# Patient Record
Sex: FEMALE | URBAN - METROPOLITAN AREA
[De-identification: names, ages, dates, MRNs, and addresses within clinical notes are randomized per-mention and may not be internally consistent; named-entity substitution may affect disease eponyms.]

---

## 2020-02-13 ENCOUNTER — APPOINTMENT (RX ONLY)
Dept: URBAN - METROPOLITAN AREA CLINIC 173 | Facility: CLINIC | Age: 50
Setting detail: DERMATOLOGY
End: 2020-02-13

## 2020-02-13 DIAGNOSIS — Z41.9 ENCOUNTER FOR PROCEDURE FOR PURPOSES OTHER THAN REMEDYING HEALTH STATE, UNSPECIFIED: ICD-10-CM

## 2020-02-13 PROCEDURE — ? BOTOX

## 2020-02-13 PROCEDURE — ? COSMETIC CONSULTATION: LASER RESURFACING

## 2020-02-13 PROCEDURE — ? COSMETIC CONSULTATION: BOTULINUM TOXIN

## 2020-02-13 NOTE — PROCEDURE: BOTOX
Detail Level: Detailed
Left Pupillary Line Units: 0
Show Masseter Units: Yes
Show Ucl Units: No
Lot #: [id number]
Price (Use Numbers Only, No Special Characters Or $): 120
Dilution (U/0.1 Cc): 5
Consent: Written consent obtained. Risks include but not limited to lid/brow ptosis, bruising, swelling, diplopia, temporary effect, incomplete chemical denervation.
Additional Area 1 Location: lips
Post-Care Instructions: Patient instructed to not lie down for 4 hours and limit physical activity for 24 hours. Patient instructed not to travel by airplane for 48 hours.
Expiration Date (Month Year): 8/22
Additional Area 1 Units: 6

## 2020-02-27 ENCOUNTER — APPOINTMENT (RX ONLY)
Dept: URBAN - METROPOLITAN AREA CLINIC 173 | Facility: CLINIC | Age: 50
Setting detail: DERMATOLOGY
End: 2020-02-27

## 2020-02-27 DIAGNOSIS — Z41.9 ENCOUNTER FOR PROCEDURE FOR PURPOSES OTHER THAN REMEDYING HEALTH STATE, UNSPECIFIED: ICD-10-CM

## 2020-02-27 PROCEDURE — ? BOTOX

## 2020-02-27 NOTE — PROCEDURE: BOTOX
Show Right And Left Pupillary Line Units: No
Consent: Written consent obtained. Risks include but not limited to lid/brow ptosis, bruising, swelling, diplopia, temporary effect, incomplete chemical denervation.
Additional Area 2 Units: 6
Periorbital Skin Units: 0
Lot #: [id number]
Detail Level: Detailed
Dilution (U/0.1 Cc): 5
Show Additional Area 5: Yes
Post-Care Instructions: Patient instructed to not lie down for 4 hours and limit physical activity for 24 hours. Patient instructed not to travel by airplane for 48 hours.
Additional Area 3 Location: periorb
Forehead Units: 7
Additional Area 1 Location: upper lip
Additional Area 3 Units: 28
Topical Anesthesia?: 23% lidocaine, 7% tetracaine
Glabellar Complex Units: 18
Expiration Date (Month Year): 8/22
Additional Area 1 Units: 3
Price (Use Numbers Only, No Special Characters Or $): 570
Additional Area 2 Location: [facility]

## 2020-02-28 ENCOUNTER — APPOINTMENT (RX ONLY)
Dept: URBAN - METROPOLITAN AREA CLINIC 173 | Facility: CLINIC | Age: 50
Setting detail: DERMATOLOGY
End: 2020-02-28

## 2020-02-28 DIAGNOSIS — Z41.9 ENCOUNTER FOR PROCEDURE FOR PURPOSES OTHER THAN REMEDYING HEALTH STATE, UNSPECIFIED: ICD-10-CM

## 2020-02-28 PROCEDURE — ? PALOMAR ICON LASER

## 2020-02-28 ASSESSMENT — LOCATION DETAILED DESCRIPTION DERM: LOCATION DETAILED: LEFT UPPER CUTANEOUS LIP

## 2020-02-28 ASSESSMENT — LOCATION SIMPLE DESCRIPTION DERM: LOCATION SIMPLE: LEFT LIP

## 2020-02-28 ASSESSMENT — LOCATION ZONE DERM: LOCATION ZONE: LIP

## 2020-02-28 NOTE — PROCEDURE: PALOMAR ICON LASER
Treated Area: small area
Fluence Units: J/cm2
Treatment Number: 1
Post-Care Instructions: I reviewed with the patient in detail post-care instructions. Patient should stay away from the sun and wear sun protection until treated areas are fully healed.
Pulse Duration (In Milliseconds): 20
Pre-Procedure Text: The patient's skin was cleaned and Lux Lotion was applied.
Fluence: 25
Detail Level: Simple
Location: upper cutaneous lip
Consent: Written consent obtained, risks reviewed including but not limited to crusting, scabbing, blistering, scarring, darker or lighter pigmentary change, bruising, and/or incomplete response.
Pulse Duration (In Milliseconds): 20
Price (Use Numbers Only, No Special Characters Or $): 0
Render Post Care In The Note?: yes
External Cooling Fan Speed: 5
Fluence: 30

## 2020-03-12 ENCOUNTER — APPOINTMENT (RX ONLY)
Dept: URBAN - METROPOLITAN AREA CLINIC 173 | Facility: CLINIC | Age: 50
Setting detail: DERMATOLOGY
End: 2020-03-12

## 2020-03-12 DIAGNOSIS — Z41.9 ENCOUNTER FOR PROCEDURE FOR PURPOSES OTHER THAN REMEDYING HEALTH STATE, UNSPECIFIED: ICD-10-CM

## 2020-03-12 PROCEDURE — ? BOTOX

## 2020-03-12 NOTE — PROCEDURE: BOTOX
[facility] Units: 0
Show Right And Left Brow Units: No
Periorbital Skin Units: 4.5
Additional Area 2 Location: [facility]
Show Topical Anesthesia: Yes
Additional Area 3 Location: periorb
Consent: Written consent obtained. Risks include but not limited to lid/brow ptosis, bruising, swelling, diplopia, temporary effect, incomplete chemical denervation.
Lot #: [id number]**marcy special**
Expiration Date (Month Year): 8/22
Post-Care Instructions: Patient instructed to not lie down for 4 hours and limit physical activity for 24 hours. Patient instructed not to travel by airplane for 48 hours.
Dilution (U/0.1 Cc): 5
Detail Level: Detailed

## 2020-06-30 ENCOUNTER — APPOINTMENT (RX ONLY)
Dept: URBAN - METROPOLITAN AREA CLINIC 173 | Facility: CLINIC | Age: 50
Setting detail: DERMATOLOGY
End: 2020-06-30

## 2020-06-30 DIAGNOSIS — Z41.9 ENCOUNTER FOR PROCEDURE FOR PURPOSES OTHER THAN REMEDYING HEALTH STATE, UNSPECIFIED: ICD-10-CM

## 2020-06-30 PROCEDURE — ? BOTOX

## 2020-06-30 ASSESSMENT — LOCATION SIMPLE DESCRIPTION DERM: LOCATION SIMPLE: LEFT CHEEK

## 2020-06-30 ASSESSMENT — LOCATION ZONE DERM: LOCATION ZONE: FACE

## 2020-06-30 ASSESSMENT — LOCATION DETAILED DESCRIPTION DERM: LOCATION DETAILED: LEFT INFERIOR CENTRAL MALAR CHEEK

## 2020-06-30 NOTE — PROCEDURE: BOTOX
Additional Area 2 Units: 0
Consent: Written consent obtained. Risks include but not limited to lid/brow ptosis, bruising, swelling, diplopia, temporary effect, incomplete chemical denervation.
Show Ucl Units: No
Show Anterior Platysmal Band Units: Yes
Lot #: [id number]
Dilution (U/0.1 Cc): 5
Additional Area 1 Location: face
Post-Care Instructions: Patient instructed to not lie down for 4 hours and limit physical activity for 24 hours. Patient instructed not to travel by airplane for 48 hours.
Additional Area 1 Units: 70
Expiration Date (Month Year): 9/22
Detail Level: Zone
Price (Use Numbers Only, No Special Characters Or $): 248

## 2020-07-07 ENCOUNTER — RX ONLY (OUTPATIENT)
Age: 50
Setting detail: RX ONLY
End: 2020-07-07

## 2020-07-07 RX ORDER — PHARMACY COMPOUNDING ACCESSORY
EACH MISCELLANEOUS
Qty: 1 | Refills: 1 | Status: ERX

## 2020-07-09 ENCOUNTER — APPOINTMENT (RX ONLY)
Dept: URBAN - METROPOLITAN AREA CLINIC 173 | Facility: CLINIC | Age: 50
Setting detail: DERMATOLOGY
End: 2020-07-09

## 2020-07-09 DIAGNOSIS — Z41.9 ENCOUNTER FOR PROCEDURE FOR PURPOSES OTHER THAN REMEDYING HEALTH STATE, UNSPECIFIED: ICD-10-CM

## 2020-07-09 PROCEDURE — ? SECRET RF

## 2020-07-09 PROCEDURE — ? BOTOX

## 2020-07-09 NOTE — HPI: COSMETIC PROCEDURE
When Outside In The Sun, Do You...: mostly tans, rarely burns
Additional History: Patient is in clinic today for her first treatment with Pixel8 RF.

## 2020-07-09 NOTE — PROCEDURE: BOTOX
Forehead Units: 1.5
Show Ucl Units: No
Nasal Root Units: 0
Show Anterior Platysmal Band Units: Yes
Consent: Written consent obtained. Risks include but not limited to lid/brow ptosis, bruising, swelling, diplopia, temporary effect, incomplete chemical denervation.
Lot #: [id number]
Post-Care Instructions: Patient instructed to not lie down for 4 hours and limit physical activity for 24 hours. Patient instructed not to travel by airplane for 48 hours.
Dilution (U/0.1 Cc): 5
Expiration Date (Month Year): 12/22
Detail Level: Detailed

## 2020-07-09 NOTE — PROCEDURE: SECRET RF
Depth In Microns: 0.5
Tip: 24-Insulated
Passes: 0
Intensity (Include Units If Applicable): 6
Treatment Area: face
Passes: 2
Consent: Written consent obtained, risks reviewed including but not limited to darker or lighter pigmentary change, and/or incomplete improvement.
Render Post-Care In The Note: Yes
Post-Care Instructions: I reviewed with the patient in detail post-care instructions. Patient should apply moisturizer until fully healed.
Detail Level: Simple
Price (Use Numbers Only, No Special Characters Or $): 500
Indication: Wrinkles
Treatment Number: 1
Rf Duration (Include Units If Applicable): 300
Use Distraction Techniques In Note: No

## 2020-08-06 ENCOUNTER — APPOINTMENT (RX ONLY)
Dept: URBAN - METROPOLITAN AREA CLINIC 173 | Facility: CLINIC | Age: 50
Setting detail: DERMATOLOGY
End: 2020-08-06

## 2020-08-06 DIAGNOSIS — Z41.9 ENCOUNTER FOR PROCEDURE FOR PURPOSES OTHER THAN REMEDYING HEALTH STATE, UNSPECIFIED: ICD-10-CM

## 2020-08-06 PROCEDURE — ? SECRET RF

## 2020-08-06 NOTE — HPI: COSMETIC (LASER RESURFACING)
Have You Had Laser Resurfacing Before?: has had previous treatments
When Was Your Last Laser Resurfacing Treatment?: 7/9/20

## 2020-08-06 NOTE — PROCEDURE: SECRET RF
Tip: 24-Insulated
Passes: 0
Depth In Microns: 0.5
Use Distraction Techniques In Note: No
Detail Level: Simple
Depth In Microns: 1.5
Additional Treatment Area: perioral
Intensity (Include Units If Applicable): 5
Rf Duration (Include Units If Applicable): 200
Passes: 2
Rf Duration (Include Units If Applicable): 200
Post-Care Instructions: I reviewed with the patient in detail post-care instructions. Patient should apply moisturizer until fully healed.
Treatment Number: 1
Consent: Written consent obtained, risks reviewed including but not limited to darker or lighter pigmentary change, and/or incomplete improvement.
Treatment Area: periorbital
Additional Treatment Area: forehead
Treatment Area: mid-lower face

## 2020-11-19 ENCOUNTER — APPOINTMENT (RX ONLY)
Dept: URBAN - METROPOLITAN AREA CLINIC 173 | Facility: CLINIC | Age: 50
Setting detail: DERMATOLOGY
End: 2020-11-19

## 2020-11-19 DIAGNOSIS — Z41.9 ENCOUNTER FOR PROCEDURE FOR PURPOSES OTHER THAN REMEDYING HEALTH STATE, UNSPECIFIED: ICD-10-CM

## 2020-11-19 PROCEDURE — ? BOTOX

## 2020-11-19 NOTE — PROCEDURE: BOTOX
Forehead Units: 7
Show Ucl Units: No
Nasal Root Units: 0
Show Anterior Platysmal Band Units: Yes
Depressor Anguli Oris Units: 6
Consent: Written consent obtained. Risks include but not limited to lid/brow ptosis, bruising, swelling, diplopia, temporary effect, incomplete chemical denervation.
Lot #: [id number]
Post-Care Instructions: Patient instructed to not lie down for 4 hours and limit physical activity for 24 hours. Patient instructed not to travel by airplane for 48 hours.
Additional Area 1 Location: Lips
Dilution (U/0.1 Cc): 5
Expiration Date (Month Year): 6/23
Glabellar Complex Units: 18
Additional Area 1 Units: 8
Detail Level: Detailed
Price (Use Numbers Only, No Special Characters Or $): 644
Inferior Lateral Orbicularis Oculi Units: 31

## 2020-12-03 ENCOUNTER — APPOINTMENT (RX ONLY)
Dept: URBAN - METROPOLITAN AREA CLINIC 173 | Facility: CLINIC | Age: 50
Setting detail: DERMATOLOGY
End: 2020-12-03

## 2020-12-03 DIAGNOSIS — L57.8 OTHER SKIN CHANGES DUE TO CHRONIC EXPOSURE TO NONIONIZING RADIATION: ICD-10-CM

## 2020-12-03 DIAGNOSIS — Z41.9 ENCOUNTER FOR PROCEDURE FOR PURPOSES OTHER THAN REMEDYING HEALTH STATE, UNSPECIFIED: ICD-10-CM

## 2020-12-03 PROCEDURE — ? SECRET RF

## 2020-12-03 PROCEDURE — ? BOTOX

## 2020-12-03 PROCEDURE — ? TREATMENT REGIMEN

## 2020-12-03 ASSESSMENT — LOCATION DETAILED DESCRIPTION DERM
LOCATION DETAILED: LEFT SUPERIOR LATERAL NECK
LOCATION DETAILED: RIGHT CENTRAL MALAR CHEEK

## 2020-12-03 ASSESSMENT — LOCATION ZONE DERM
LOCATION ZONE: NECK
LOCATION ZONE: FACE

## 2020-12-03 ASSESSMENT — LOCATION SIMPLE DESCRIPTION DERM
LOCATION SIMPLE: LEFT ANTERIOR NECK
LOCATION SIMPLE: RIGHT CHEEK

## 2020-12-03 NOTE — PROCEDURE: SECRET RF
Tip: 24-Insulated
Depth In Microns: 0.5
Passes: 0
Tip: 64-Insulated
Post-Care Instructions: I reviewed with the patient in detail post-care instructions. Patient should apply moisturizer until fully healed.
Passes: 3
Use Distraction Techniques In Note: No
Intensity (Include Units If Applicable): 5
Rf Duration (Include Units If Applicable): 200ms
Indication: Wrinkles
Passes: 2
Additional Treatment Area: forehead
Treatment Area: periorbital
Rf Duration (Include Units If Applicable): 300
Additional Treatment Area: neck
Intensity (Include Units If Applicable): 6
Treatment Number: 1
Rf Duration (Include Units If Applicable): 300ms
Detail Level: Zone
Additional Treatment Area: mid-lower face
Additional Treatment Area: upper face
Consent: Written consent obtained, risks reviewed including but not limited to darker or lighter pigmentary change, and/or incomplete improvement.
Render Post-Care In The Note: Yes

## 2020-12-03 NOTE — PROCEDURE: BOTOX
Additional Area 6 Units: 0
Show Mentalis Units: No
Show Periorbital Units: Yes
Expiration Date (Month Year): 6/2023
Lot #: [id number] *JESSICA special*
Consent: Written consent obtained. Risks include but not limited to lid/brow ptosis, bruising, swelling, diplopia, temporary effect, incomplete chemical denervation.
Forehead Units: 2
Post-Care Instructions: Patient instructed to not lie down for 4 hours and limit physical activity for 24 hours. Patient instructed not to travel by airplane for 48 hours.
Dilution (U/0.1 Cc): 5
Detail Level: Detailed
Never smoker

## 2020-12-03 NOTE — PROCEDURE: TREATMENT REGIMEN
Detail Level: Zone
Samples Given: Skin better Alpharet nightly as tolerated instead of current retinoid \\nAlastin skin restorative

## 2021-02-16 ENCOUNTER — APPOINTMENT (RX ONLY)
Dept: URBAN - METROPOLITAN AREA CLINIC 173 | Facility: CLINIC | Age: 51
Setting detail: DERMATOLOGY
End: 2021-02-16

## 2021-02-16 DIAGNOSIS — Z41.9 ENCOUNTER FOR PROCEDURE FOR PURPOSES OTHER THAN REMEDYING HEALTH STATE, UNSPECIFIED: ICD-10-CM

## 2021-02-16 PROCEDURE — ? BOTOX

## 2021-02-16 PROCEDURE — ? COSMETIC CONSULTATION: LASER RESURFACING

## 2021-02-16 NOTE — PROCEDURE: BOTOX
Show Glabellar Units: Yes
L Brow Units: 0
Glabellar Complex Units: 18
Additional Area 1 Units: 8
Show Mentalis Units: No
Detail Level: Detailed
Price (Use Numbers Only, No Special Characters Or $): 532
Inferior Lateral Orbicularis Oculi Units: 29
Expiration Date (Month Year): 9/23
Dilution (U/0.1 Cc): 5
Consent: Written consent obtained. Risks include but not limited to lid/brow ptosis, bruising, swelling, diplopia, temporary effect, incomplete chemical denervation.
Depressor Anguli Oris Units: 6
Lot #: [id number]
Additional Area 1 Location: lip
Post-Care Instructions: Patient instructed to not lie down for 4 hours and limit physical activity for 24 hours. Patient instructed not to travel by airplane for 48 hours.
Forehead Units: 3.5

## 2021-03-03 ENCOUNTER — APPOINTMENT (RX ONLY)
Dept: URBAN - METROPOLITAN AREA CLINIC 173 | Facility: CLINIC | Age: 51
Setting detail: DERMATOLOGY
End: 2021-03-03

## 2021-03-03 DIAGNOSIS — Z41.9 ENCOUNTER FOR PROCEDURE FOR PURPOSES OTHER THAN REMEDYING HEALTH STATE, UNSPECIFIED: ICD-10-CM

## 2021-03-03 PROCEDURE — ? BOTOX

## 2021-03-03 ASSESSMENT — LOCATION DETAILED DESCRIPTION DERM: LOCATION DETAILED: LEFT INFERIOR CENTRAL MALAR CHEEK

## 2021-03-03 ASSESSMENT — LOCATION ZONE DERM: LOCATION ZONE: FACE

## 2021-03-03 ASSESSMENT — LOCATION SIMPLE DESCRIPTION DERM: LOCATION SIMPLE: LEFT CHEEK

## 2021-03-03 NOTE — PROCEDURE: BOTOX
Show Nasal Units: Yes
Additional Area 1 Units: 0
Expiration Date (Month Year): 5/2022
Show Mentalis Units: No
Inferior Lateral Orbicularis Oculi Units: 12
Consent: Written consent obtained. Risks include but not limited to lid/brow ptosis, bruising, swelling, diplopia, temporary effect, incomplete chemical denervation.
Dilution (U/0.1 Cc): 5
Detail Level: Detailed
Post-Care Instructions: Patient instructed to not lie down for 4 hours and limit physical activity for 24 hours. Patient instructed not to travel by airplane for 48 hours.
Lot #: [id number] *JESSICA special*

## 2021-05-11 ENCOUNTER — APPOINTMENT (RX ONLY)
Dept: URBAN - METROPOLITAN AREA CLINIC 173 | Facility: CLINIC | Age: 51
Setting detail: DERMATOLOGY
End: 2021-05-11

## 2021-05-11 DIAGNOSIS — Z41.9 ENCOUNTER FOR PROCEDURE FOR PURPOSES OTHER THAN REMEDYING HEALTH STATE, UNSPECIFIED: ICD-10-CM

## 2021-05-11 PROCEDURE — ? BOTOX

## 2021-05-11 NOTE — PROCEDURE: BOTOX
Additional Area 6 Units: 0
Show Nasal Units: Yes
Show Lcl Units: No
Expiration Date (Month Year): 7/23
Lot #: [id number]
Price (Use Numbers Only, No Special Characters Or $): 150
Dilution (U/0.1 Cc): 5
Additional Area 1 Location: face
Consent: Written consent obtained. Risks include but not limited to lid/brow ptosis, bruising, swelling, diplopia, temporary effect, incomplete chemical denervation.
Detail Level: Detailed
Additional Area 1 Units: 8
Post-Care Instructions: Patient instructed to not lie down for 4 hours and limit physical activity for 24 hours. Patient instructed not to travel by airplane for 48 hours.

## 2021-05-17 ENCOUNTER — APPOINTMENT (RX ONLY)
Dept: URBAN - METROPOLITAN AREA CLINIC 173 | Facility: CLINIC | Age: 51
Setting detail: DERMATOLOGY
End: 2021-05-17

## 2021-05-17 DIAGNOSIS — Z41.9 ENCOUNTER FOR PROCEDURE FOR PURPOSES OTHER THAN REMEDYING HEALTH STATE, UNSPECIFIED: ICD-10-CM

## 2021-05-17 PROCEDURE — ? BOTOX

## 2021-05-17 PROCEDURE — ? PRESCRIPTION

## 2021-05-17 RX ORDER — DOXYCYCLINE HYCLATE 100 MG/1
CAPSULE, GELATIN COATED ORAL
Qty: 14 | Refills: 0 | Status: ERX

## 2021-05-17 RX ORDER — VALACYCLOVIR HYDROCHLORIDE 500 MG/1
TABLET, FILM COATED ORAL
Qty: 10 | Refills: 0 | Status: ERX | COMMUNITY
Start: 2021-05-17

## 2021-05-17 RX ADMIN — VALACYCLOVIR HYDROCHLORIDE: 500 TABLET, FILM COATED ORAL at 00:00

## 2021-05-17 NOTE — PROCEDURE: BOTOX
Detail Level: Detailed
Show Mentalis Units: No
Right Pupillary Line Units: 0
Show Lateral Platysmal Band Units: Yes
Additional Area 1 Units: 8
Expiration Date (Month Year): 9/22
Lot #: [id number]
Depressor Anguli Oris Units: 6
Forehead Units: 3.5
Inferior Lateral Orbicularis Oculi Units: 31
Price (Use Numbers Only, No Special Characters Or $): 598
Post-Care Instructions: Patient instructed to not lie down for 4 hours and limit physical activity for 24 hours. Patient instructed not to travel by airplane for 48 hours.
Dilution (U/0.1 Cc): 5
Consent: Written consent obtained. Risks include but not limited to lid/brow ptosis, bruising, swelling, diplopia, temporary effect, incomplete chemical denervation.
Glabellar Complex Units: 18
Additional Area 1 Location: perioral

## 2021-08-31 ENCOUNTER — APPOINTMENT (RX ONLY)
Dept: URBAN - METROPOLITAN AREA CLINIC 173 | Facility: CLINIC | Age: 51
Setting detail: DERMATOLOGY
End: 2021-08-31

## 2021-08-31 DIAGNOSIS — Z41.9 ENCOUNTER FOR PROCEDURE FOR PURPOSES OTHER THAN REMEDYING HEALTH STATE, UNSPECIFIED: ICD-10-CM

## 2021-08-31 PROCEDURE — ? BOTOX

## 2021-08-31 NOTE — PROCEDURE: BOTOX
Detail Level: Detailed
Show Mentalis Units: No
Right Pupillary Line Units: 0
Show Lateral Platysmal Band Units: Yes
Periorbital Skin Units: 29
Expiration Date (Month Year): 10/23
Lot #: [id number]
Depressor Anguli Oris Units: 6
Forehead Units: 3
Price (Use Numbers Only, No Special Characters Or $): 993
Post-Care Instructions: Patient instructed to not lie down for 4 hours and limit physical activity for 24 hours. Patient instructed not to travel by airplane for 48 hours.
Dilution (U/0.1 Cc): 5
Consent: Written consent obtained. Risks include but not limited to lid/brow ptosis, bruising, swelling, diplopia, temporary effect, incomplete chemical denervation.
Glabellar Complex Units: 12

## 2021-09-27 ENCOUNTER — APPOINTMENT (RX ONLY)
Dept: URBAN - METROPOLITAN AREA CLINIC 173 | Facility: CLINIC | Age: 51
Setting detail: DERMATOLOGY
End: 2021-09-27

## 2021-09-27 DIAGNOSIS — Z41.9 ENCOUNTER FOR PROCEDURE FOR PURPOSES OTHER THAN REMEDYING HEALTH STATE, UNSPECIFIED: ICD-10-CM

## 2021-09-27 PROCEDURE — ? BOTOX

## 2021-09-27 NOTE — PROCEDURE: BOTOX
Additional Area 6 Units: 0
Show Levator Superior Units: Yes
Show Mentalis Units: No
Expiration Date (Month Year): 11/23
Detail Level: Detailed
Lot #: [id number]**special**
Forehead Units: 2
Post-Care Instructions: Patient instructed to not lie down for 4 hours and limit physical activity for 24 hours. Patient instructed not to travel by airplane for 48 hours.
Consent: Written consent obtained. Risks include but not limited to lid/brow ptosis, bruising, swelling, diplopia, temporary effect, incomplete chemical denervation.
Dilution (U/0.1 Cc): 5

## 2021-09-28 ENCOUNTER — RX ONLY (OUTPATIENT)
Age: 51
Setting detail: RX ONLY
End: 2021-09-28

## 2021-09-28 RX ORDER — DIAZEPAM 5 MG/1
TABLET ORAL ONCE
Qty: 2 | Refills: 0 | Status: ERX | COMMUNITY
Start: 2021-09-27

## 2021-10-07 ENCOUNTER — APPOINTMENT (RX ONLY)
Dept: URBAN - METROPOLITAN AREA CLINIC 173 | Facility: CLINIC | Age: 51
Setting detail: DERMATOLOGY
End: 2021-10-07

## 2021-10-07 DIAGNOSIS — Z41.9 ENCOUNTER FOR PROCEDURE FOR PURPOSES OTHER THAN REMEDYING HEALTH STATE, UNSPECIFIED: ICD-10-CM

## 2021-10-07 PROCEDURE — ? PRO-NOX

## 2021-10-07 PROCEDURE — ? ULTRAPULSE LASER

## 2021-10-07 ASSESSMENT — LOCATION SIMPLE DESCRIPTION DERM
LOCATION SIMPLE: LEFT ANTERIOR NECK
LOCATION SIMPLE: LEFT CHEEK

## 2021-10-07 ASSESSMENT — LOCATION ZONE DERM
LOCATION ZONE: FACE
LOCATION ZONE: NECK

## 2021-10-07 ASSESSMENT — LOCATION DETAILED DESCRIPTION DERM
LOCATION DETAILED: LEFT INFERIOR CENTRAL MALAR CHEEK
LOCATION DETAILED: LEFT SUPERIOR ANTERIOR NECK

## 2021-10-07 NOTE — PROCEDURE: ULTRAPULSE LASER
Cool Scan: on
Density % (Set To 0 If Using Density Field Above): 0
Number Of Pulses: 1
Additional Laser Settings?: no
Post-Care Instructions: I reviewed with the patient in detail post-care instructions. Patient should avoid sun until area fully healed. Pt should apply vaseline to treated areas, and remove crusts gently with water-vinegar soaks.
Second Anesthesia Type: 1% lidocaine with epinephrine
External Cooling Fan Speed: 5
Consent: Written consent obtained, risks reviewed including but not limited to crusting, scabbing, blistering, scarring, darker or lighter pigmentary change, incomplete improvement of dyschromia, wrinkles, prolonged erythema and facial swelling, infection and bleeding.
Hsv Prophylaxis Prescription: Valtrex 500mg BID starting the day of procedures and continuing until all sites healed
Detail Level: Detailed

## 2021-10-07 NOTE — PROCEDURE: PRO-NOX
Post-Care Instructions: The patient was instructed to call the office if they had any lasting side effects or concerns.
Consent: Written consent obtained, risks reviewed including but not limited to euphoria, light-headedness, nausea, vomiting and dizziness.
Price (Use Numbers Only, No Special Characters Or $): 0
Pre-Procedure Text: The patient was connected to the Pro-Nox machine via mask and positioned appropriately for the anticipated procedure.  Following the procedure they were disconnected and monitored for any lasting side effects prior to leaving the office.
Detail Level: Zone

## 2021-10-11 ENCOUNTER — APPOINTMENT (RX ONLY)
Dept: URBAN - METROPOLITAN AREA CLINIC 173 | Facility: CLINIC | Age: 51
Setting detail: DERMATOLOGY
End: 2021-10-11

## 2021-10-11 DIAGNOSIS — Z41.9 ENCOUNTER FOR PROCEDURE FOR PURPOSES OTHER THAN REMEDYING HEALTH STATE, UNSPECIFIED: ICD-10-CM

## 2021-10-11 PROCEDURE — ? PATIENT SPECIFIC COUNSELING

## 2021-10-11 NOTE — PROCEDURE: PATIENT SPECIFIC COUNSELING
Detail Level: Zone
Patient with in normal healing limits. Patient was instructed to continue gauze soaks and full skin care regimen until Thursday (10/14/2021).

## 2021-10-13 ENCOUNTER — RX ONLY (OUTPATIENT)
Age: 51
Setting detail: RX ONLY
End: 2021-10-13

## 2021-10-13 RX ORDER — DOXYCYCLINE HYCLATE 100 MG/1
CAPSULE, GELATIN COATED ORAL BID
Qty: 4 | Refills: 0 | Status: ERX | COMMUNITY
Start: 2021-10-13

## 2021-10-14 ENCOUNTER — APPOINTMENT (RX ONLY)
Dept: URBAN - METROPOLITAN AREA CLINIC 173 | Facility: CLINIC | Age: 51
Setting detail: DERMATOLOGY
End: 2021-10-14

## 2021-10-14 DIAGNOSIS — Z41.9 ENCOUNTER FOR PROCEDURE FOR PURPOSES OTHER THAN REMEDYING HEALTH STATE, UNSPECIFIED: ICD-10-CM

## 2021-10-14 PROCEDURE — ? OTHER (COSMETIC)

## 2021-10-14 NOTE — PROCEDURE: OTHER (COSMETIC)
Other (Free Text): Patient 1 week CO2 laser. Patient is healing well, erythema present. \\n\\nPatient has no questions or concerns and will follow-up again in 1 week.
Detail Level: Zone

## 2021-10-21 ENCOUNTER — APPOINTMENT (RX ONLY)
Dept: URBAN - METROPOLITAN AREA CLINIC 173 | Facility: CLINIC | Age: 51
Setting detail: DERMATOLOGY
End: 2021-10-21

## 2021-10-21 DIAGNOSIS — Z41.9 ENCOUNTER FOR PROCEDURE FOR PURPOSES OTHER THAN REMEDYING HEALTH STATE, UNSPECIFIED: ICD-10-CM | Status: IMPROVED

## 2021-10-21 PROCEDURE — ? PATIENT SPECIFIC COUNSELING

## 2021-10-21 PROCEDURE — ? PRESCRIPTION

## 2021-10-21 RX ORDER — DESONIDE 0.5 MG/G
CREAM TOPICAL
Qty: 60 | Refills: 1 | Status: ERX | COMMUNITY
Start: 2021-10-21

## 2021-10-21 RX ADMIN — DESONIDE: 0.5 CREAM TOPICAL at 00:00

## 2021-10-21 ASSESSMENT — LOCATION DETAILED DESCRIPTION DERM: LOCATION DETAILED: LEFT CHIN

## 2021-10-21 ASSESSMENT — LOCATION ZONE DERM: LOCATION ZONE: FACE

## 2021-10-21 ASSESSMENT — LOCATION SIMPLE DESCRIPTION DERM: LOCATION SIMPLE: CHIN

## 2021-10-21 NOTE — PROCEDURE: PATIENT SPECIFIC COUNSELING
Detail Level: Zone
Patient with in normal healing limits, slightly pink and patient advised to use a topical steroid to help heal deeper treatment zones. Patient aware she can now wear makeup and go back to patients normal skin care, * avoid retinoids and exfoliates * for the next 6 weeks.

## 2021-11-15 ENCOUNTER — RX ONLY (OUTPATIENT)
Age: 51
Setting detail: RX ONLY
End: 2021-11-15

## 2021-11-15 RX ORDER — PHARMACY COMPOUNDING ACCESSORY
EACH MISCELLANEOUS
Qty: 30 | Refills: 0 | Status: ERX | COMMUNITY
Start: 2021-11-15

## 2021-12-06 ENCOUNTER — APPOINTMENT (RX ONLY)
Dept: URBAN - METROPOLITAN AREA CLINIC 173 | Facility: CLINIC | Age: 51
Setting detail: DERMATOLOGY
End: 2021-12-06

## 2021-12-06 DIAGNOSIS — Z41.9 ENCOUNTER FOR PROCEDURE FOR PURPOSES OTHER THAN REMEDYING HEALTH STATE, UNSPECIFIED: ICD-10-CM

## 2021-12-06 PROCEDURE — ? PATIENT SPECIFIC COUNSELING

## 2021-12-06 PROCEDURE — ? BOTOX

## 2021-12-06 NOTE — PROCEDURE: PATIENT SPECIFIC COUNSELING
Detail Level: Zone
Continue to use Tretinoin 0.05 nightly; can treat post CO2 scars on right lower mouth corner with Fraxel 1550 with small tip; 3-4 treatments \\nPhotos taken

## 2021-12-06 NOTE — PROCEDURE: BOTOX
Show Topical Anesthesia: Yes
Left Pupillary Line Units: 0
Show Lcl Units: No
Lot #: [id number]
Depressor Anguli Oris Units: 6
Expiration Date (Month Year): 04/24
Forehead Units: 3
Dilution (U/0.1 Cc): 5
Price (Use Numbers Only, No Special Characters Or $): 027
Additional Area 1 Location: lips
Glabellar Complex Units: 18
Additional Area 1 Units: 8
Consent: Written consent obtained. Risks include but not limited to lid/brow ptosis, bruising, swelling, diplopia, temporary effect, incomplete chemical denervation.
Post-Care Instructions: Patient instructed to not lie down for 4 hours and limit physical activity for 24 hours. Patient instructed not to travel by airplane for 48 hours.
Detail Level: Detailed
Inferior Lateral Orbicularis Oculi Units: 29

## 2021-12-14 ENCOUNTER — APPOINTMENT (RX ONLY)
Dept: URBAN - METROPOLITAN AREA CLINIC 173 | Facility: CLINIC | Age: 51
Setting detail: DERMATOLOGY
End: 2021-12-14

## 2021-12-14 DIAGNOSIS — Z41.9 ENCOUNTER FOR PROCEDURE FOR PURPOSES OTHER THAN REMEDYING HEALTH STATE, UNSPECIFIED: ICD-10-CM

## 2021-12-14 PROCEDURE — ? FRAXEL

## 2021-12-14 PROCEDURE — ? PRESCRIPTION

## 2021-12-14 PROCEDURE — ? OTHER (COSMETIC)

## 2021-12-14 RX ORDER — LIDOCAINE AND PRILOCAINE 25; 25 MG/G; MG/G
CREAM TOPICAL
Qty: 30 | Refills: 3 | Status: ERX | COMMUNITY
Start: 2021-12-14

## 2021-12-14 RX ADMIN — LIDOCAINE AND PRILOCAINE: 25; 25 CREAM TOPICAL at 00:00

## 2021-12-14 ASSESSMENT — LOCATION DETAILED DESCRIPTION DERM: LOCATION DETAILED: RIGHT LOWER CUTANEOUS LIP

## 2021-12-14 ASSESSMENT — LOCATION ZONE DERM: LOCATION ZONE: LIP

## 2021-12-14 ASSESSMENT — LOCATION SIMPLE DESCRIPTION DERM: LOCATION SIMPLE: RIGHT LIP

## 2021-12-14 NOTE — PROCEDURE: FRAXEL
Number Of Passes: 1
Consent: Written consent obtained, risks reviewed including but not limited to pain and incomplete improvement.
Detail Level: Zone
Total Coverage: 14%
Wavelength: 1550nm
Anesthesia Type: 1% lidocaine with epinephrine
Indication: surgical scars
Total Energy In Kj (Optional- Don't Include Units): 0.06
Location: chin
Topical Anesthesia Type: 23% lidocaine, 7% tetracaine
Energy(Mj/Cm2): 60
Post-Care Instructions: I reviewed with the patient in detail post-care instructions. Patient should avoid sun until area fully healed.
Length Of Topical Anesthesia Application (Optional): 60 minutes
Treatment Level: 5
Number Of Passes: 6
Add Post-Care Below To The Note: No
Price (Use Numbers Only, No Special Characters Or $): 0

## 2022-02-08 ENCOUNTER — APPOINTMENT (RX ONLY)
Dept: URBAN - METROPOLITAN AREA CLINIC 173 | Facility: CLINIC | Age: 52
Setting detail: DERMATOLOGY
End: 2022-02-08

## 2022-02-08 DIAGNOSIS — Z41.9 ENCOUNTER FOR PROCEDURE FOR PURPOSES OTHER THAN REMEDYING HEALTH STATE, UNSPECIFIED: ICD-10-CM

## 2022-02-08 PROCEDURE — ? FRAXEL

## 2022-02-08 ASSESSMENT — LOCATION ZONE DERM: LOCATION ZONE: LIP

## 2022-02-08 ASSESSMENT — LOCATION SIMPLE DESCRIPTION DERM: LOCATION SIMPLE: RIGHT LIP

## 2022-02-08 ASSESSMENT — LOCATION DETAILED DESCRIPTION DERM: LOCATION DETAILED: RIGHT LOWER CUTANEOUS LIP

## 2022-02-08 NOTE — PROCEDURE: FRAXEL
Number Of Passes: 1
Consent: Written consent obtained, risks reviewed including but not limited to pain and incomplete improvement.
Detail Level: Zone
Total Coverage: 14%
Wavelength: 1550nm
Anesthesia Type: 1% lidocaine with epinephrine
Indication: surgical scars
Total Energy In Kj (Optional- Don't Include Units): 0.05
Location: chin
Treatment Number: 2
Topical Anesthesia Type: 23% lidocaine, 7% tetracaine
Energy(Mj/Cm2): 60
Post-Care Instructions: I reviewed with the patient in detail post-care instructions. Patient should avoid sun until area fully healed.
Length Of Topical Anesthesia Application (Optional): 60 minutes
Treatment Level: 5
Number Of Passes: 6
Add Post-Care Below To The Note: No
Price (Use Numbers Only, No Special Characters Or $): 0

## 2022-03-03 ENCOUNTER — APPOINTMENT (RX ONLY)
Dept: URBAN - METROPOLITAN AREA CLINIC 173 | Facility: CLINIC | Age: 52
Setting detail: DERMATOLOGY
End: 2022-03-03

## 2022-03-03 DIAGNOSIS — Z41.9 ENCOUNTER FOR PROCEDURE FOR PURPOSES OTHER THAN REMEDYING HEALTH STATE, UNSPECIFIED: ICD-10-CM

## 2022-03-03 PROCEDURE — ? FRAXEL

## 2022-03-03 PROCEDURE — ? BOTOX

## 2022-03-03 ASSESSMENT — LOCATION SIMPLE DESCRIPTION DERM
LOCATION SIMPLE: RIGHT LIP
LOCATION SIMPLE: LEFT CHEEK

## 2022-03-03 ASSESSMENT — LOCATION DETAILED DESCRIPTION DERM
LOCATION DETAILED: LEFT MEDIAL BUCCAL CHEEK
LOCATION DETAILED: RIGHT LOWER CUTANEOUS LIP

## 2022-03-03 ASSESSMENT — LOCATION ZONE DERM
LOCATION ZONE: LIP
LOCATION ZONE: FACE

## 2022-03-03 NOTE — PROCEDURE: FRAXEL
Number Of Passes: 1
Consent: Written consent obtained, risks reviewed including but not limited to pain and incomplete improvement.
Detail Level: Zone
Total Coverage: 14%
Wavelength: 1550nm
Anesthesia Type: 1% lidocaine with epinephrine
Indication: surgical scars
Total Energy In Kj (Optional- Don't Include Units): 0.03
Location: Use Location Override
Treatment Number: 3
Location Override: right and left lower cutaneous lip
Topical Anesthesia Type: 30% lidocaine, plasticized base
Energy(Mj/Cm2): 60
Post-Care Instructions: I reviewed with the patient in detail post-care instructions. Patient should avoid sun until area fully healed.
Length Of Topical Anesthesia Application (Optional): 40 minutes
Treatment Level: 5
Number Of Passes: 6
Add Post-Care Below To The Note: No
Price (Use Numbers Only, No Special Characters Or $): 0
Large Metal Eye Shield Text: The ocular mucosa was anesthetized with tetracaine. Once adequate anesthesia was optained, large metal eye shields were inserted and remained in place until the procedure was completed.
Large Plastic Eye Shield Text: The ocular mucosa was anesthetized with tetracaine. Once adequate anesthesia was optained, large plastic eye shields were inserted and remained in place until the procedure was completed.
Medium Metal Eye Shield Text: The ocular mucosa was anesthetized with tetracaine. Once adequate anesthesia was optained, medium metal eye shields were inserted and remained in place until the procedure was completed.
Small Plastic Eye Shield Text: The ocular mucosa was anesthetized with tetracaine. Once adequate anesthesia was optained, small plastic eye shields were inserted and remained in place until the procedure was completed.
Medium Plastic Eye Shield Text: The ocular mucosa was anesthetized with tetracaine. Once adequate anesthesia was optained, medium plastic eye shields were inserted and remained in place until the procedure was completed.
Small Metal Eye Shield Text: The ocular mucosa was anesthetized with tetracaine. Once adequate anesthesia was optained, small metal eye shields were inserted and remained in place until the procedure was completed.

## 2022-03-03 NOTE — PROCEDURE: BOTOX
Depressor Anguli Oris Units: 6
Lateral Platysmal Bands Units: 0
Show Lateral Platysmal Band Units: Yes
Show Mentalis Units: No
Expiration Date (Month Year): 05/2024
Dilution (U/0.1 Cc): 5
Forehead Units: 3
Price (Use Numbers Only, No Special Characters Or $): 853
Additional Area 1 Location: Mouth
Additional Area 1 Units: 12
Consent: Written consent obtained. Risks include but not limited to lid/brow ptosis, bruising, swelling, diplopia, temporary effect, incomplete chemical denervation.
Glabellar Complex Units: 18
Post-Care Instructions: Patient instructed to not lie down for 4 hours and limit physical activity for 24 hours. Patient instructed not to travel by airplane for 48 hours.
Additional Area 2 Location: [facility]
Periorbital Skin Units: 29
Detail Level: Detailed
Lot #: [id number]
Additional Area 2 Units: 10

## 2022-04-04 ENCOUNTER — APPOINTMENT (RX ONLY)
Dept: URBAN - METROPOLITAN AREA CLINIC 173 | Facility: CLINIC | Age: 52
Setting detail: DERMATOLOGY
End: 2022-04-04

## 2022-04-04 DIAGNOSIS — Z41.9 ENCOUNTER FOR PROCEDURE FOR PURPOSES OTHER THAN REMEDYING HEALTH STATE, UNSPECIFIED: ICD-10-CM

## 2022-04-04 PROCEDURE — ? FRAXEL

## 2022-04-04 ASSESSMENT — LOCATION DETAILED DESCRIPTION DERM
LOCATION DETAILED: RIGHT LOWER CUTANEOUS LIP
LOCATION DETAILED: LEFT MEDIAL BUCCAL CHEEK

## 2022-04-04 ASSESSMENT — LOCATION SIMPLE DESCRIPTION DERM
LOCATION SIMPLE: LEFT CHEEK
LOCATION SIMPLE: RIGHT LIP

## 2022-04-04 ASSESSMENT — LOCATION ZONE DERM
LOCATION ZONE: LIP
LOCATION ZONE: FACE

## 2022-04-04 NOTE — PROCEDURE: FRAXEL
Number Of Passes: 1
Consent: Written consent obtained, risks reviewed including but not limited to pain and incomplete improvement.
Detail Level: Zone
Total Coverage: 14%
Wavelength: 1550nm
Anesthesia Type: 1% lidocaine with epinephrine
Indication: surgical scars
Total Energy In Kj (Optional- Don't Include Units): 0.04
Location: Use Location Override
Treatment Number: 4
Location Override: right and left lower cutaneous lip
Topical Anesthesia Type: 2.5% lidocaine, 2.5% prilocaine
Energy(Mj/Cm2): 60
Post-Care Instructions: I reviewed with the patient in detail post-care instructions. Patient should avoid sun until area fully healed.
Length Of Topical Anesthesia Application (Optional): 60 minutes
Treatment Level: 5
Number Of Passes: 6
Add Post-Care Below To The Note: No
Price (Use Numbers Only, No Special Characters Or $): 0
Large Metal Eye Shield Text: The ocular mucosa was anesthetized with tetracaine. Once adequate anesthesia was optained, large metal eye shields were inserted and remained in place until the procedure was completed.
Large Plastic Eye Shield Text: The ocular mucosa was anesthetized with tetracaine. Once adequate anesthesia was optained, large plastic eye shields were inserted and remained in place until the procedure was completed.
Medium Metal Eye Shield Text: The ocular mucosa was anesthetized with tetracaine. Once adequate anesthesia was optained, medium metal eye shields were inserted and remained in place until the procedure was completed.
Small Plastic Eye Shield Text: The ocular mucosa was anesthetized with tetracaine. Once adequate anesthesia was optained, small plastic eye shields were inserted and remained in place until the procedure was completed.
Medium Plastic Eye Shield Text: The ocular mucosa was anesthetized with tetracaine. Once adequate anesthesia was optained, medium plastic eye shields were inserted and remained in place until the procedure was completed.
Small Metal Eye Shield Text: The ocular mucosa was anesthetized with tetracaine. Once adequate anesthesia was optained, small metal eye shields were inserted and remained in place until the procedure was completed.

## 2022-05-16 ENCOUNTER — APPOINTMENT (RX ONLY)
Dept: URBAN - METROPOLITAN AREA CLINIC 173 | Facility: CLINIC | Age: 52
Setting detail: DERMATOLOGY
End: 2022-05-16

## 2022-05-16 DIAGNOSIS — Z41.9 ENCOUNTER FOR PROCEDURE FOR PURPOSES OTHER THAN REMEDYING HEALTH STATE, UNSPECIFIED: ICD-10-CM

## 2022-05-16 PROCEDURE — ? BOTOX

## 2022-05-16 PROCEDURE — ? FRAXEL

## 2022-05-16 PROCEDURE — ? PATIENT SPECIFIC COUNSELING

## 2022-05-16 ASSESSMENT — LOCATION SIMPLE DESCRIPTION DERM
LOCATION SIMPLE: LEFT CHEEK
LOCATION SIMPLE: RIGHT LIP

## 2022-05-16 ASSESSMENT — LOCATION ZONE DERM
LOCATION ZONE: FACE
LOCATION ZONE: LIP

## 2022-05-16 ASSESSMENT — LOCATION DETAILED DESCRIPTION DERM
LOCATION DETAILED: RIGHT LOWER CUTANEOUS LIP
LOCATION DETAILED: LEFT MEDIAL BUCCAL CHEEK

## 2022-05-16 NOTE — PROCEDURE: BOTOX
Show Glabellar Units: Yes
Lateral Platysmal Bands Units: 0
Expiration Date (Month Year): 05/2024
Show Ucl Units: No
Lot #: [id number]
Additional Area 1 Location: Lips
Price (Use Numbers Only, No Special Characters Or $): 150
Dilution (U/0.1 Cc): 5
Additional Area 1 Units: 8
Consent: Written consent obtained. Risks include but not limited to lid/brow ptosis, bruising, swelling, diplopia, temporary effect, incomplete chemical denervation.
Detail Level: Detailed
Post-Care Instructions: Patient instructed to not lie down for 4 hours and limit physical activity for 24 hours. Patient instructed not to travel by airplane for 48 hours.

## 2022-05-16 NOTE — PROCEDURE: FRAXEL
Number Of Passes: 1
Consent: Written consent obtained, risks reviewed including but not limited to pain and incomplete improvement.
Detail Level: Zone
Total Coverage: 14%
Wavelength: 1550nm
Anesthesia Type: 1% lidocaine with epinephrine
Indication: surgical scars
Total Energy In Kj (Optional- Don't Include Units): 0.04
Location: Use Location Override
Treatment Number: 5
Location Override: right and left lower cutaneous lip
External Cooling: [facility] Cryo 5
Topical Anesthesia Type: 2.5% lidocaine, 2.5% prilocaine
Energy(Mj/Cm2): 60
Post-Care Instructions: I reviewed with the patient in detail post-care instructions. Patient should avoid sun until area fully healed.
Length Of Topical Anesthesia Application (Optional): 60 minutes
Number Of Passes: 6
Add Post-Care Below To The Note: No
Price (Use Numbers Only, No Special Characters Or $): 0
Large Metal Eye Shield Text: The ocular mucosa was anesthetized with tetracaine. Once adequate anesthesia was optained, large metal eye shields were inserted and remained in place until the procedure was completed.
Large Plastic Eye Shield Text: The ocular mucosa was anesthetized with tetracaine. Once adequate anesthesia was optained, large plastic eye shields were inserted and remained in place until the procedure was completed.
Medium Metal Eye Shield Text: The ocular mucosa was anesthetized with tetracaine. Once adequate anesthesia was optained, medium metal eye shields were inserted and remained in place until the procedure was completed.
Small Plastic Eye Shield Text: The ocular mucosa was anesthetized with tetracaine. Once adequate anesthesia was optained, small plastic eye shields were inserted and remained in place until the procedure was completed.
Medium Plastic Eye Shield Text: The ocular mucosa was anesthetized with tetracaine. Once adequate anesthesia was optained, medium plastic eye shields were inserted and remained in place until the procedure was completed.
Small Metal Eye Shield Text: The ocular mucosa was anesthetized with tetracaine. Once adequate anesthesia was optained, small metal eye shields were inserted and remained in place until the procedure was completed.

## 2022-05-16 NOTE — PROCEDURE: PATIENT SPECIFIC COUNSELING
Detail Level: Zone
Discussed scheduling an appointment with ADALGISAW for Botox (lips only) while JLS is on maternity leave.

## 2022-06-30 ENCOUNTER — APPOINTMENT (RX ONLY)
Dept: URBAN - METROPOLITAN AREA CLINIC 173 | Facility: CLINIC | Age: 52
Setting detail: DERMATOLOGY
End: 2022-06-30

## 2022-06-30 DIAGNOSIS — Z41.9 ENCOUNTER FOR PROCEDURE FOR PURPOSES OTHER THAN REMEDYING HEALTH STATE, UNSPECIFIED: ICD-10-CM

## 2022-06-30 PROCEDURE — ? BOTOX

## 2022-06-30 NOTE — PROCEDURE: BOTOX
Additional Area 1 Units: 8
Show Right And Left Pupillary Line Units: No
Show Additional Area 1: Yes
Glabellar Complex Units: 18
Consent: Written consent obtained. Risks include but not limited to lid/brow ptosis, bruising, swelling, diplopia, temporary effect, incomplete chemical denervation.
Masseter Units: 0
Additional Area 1 Location: Lips
Detail Level: Detailed
Additional Area 2 Location: [facility]
Post-Care Instructions: Patient instructed to not lie down for 4 hours and limit physical activity for 24 hours. Patient instructed not to travel by airplane for 48 hours.
Expiration Date (Month Year): 07/2024
Depressor Anguli Oris Units: 6
Additional Area 2 Units: 5
Lot #: [id number]
Periorbital Skin Units: 29
Price (Use Numbers Only, No Special Characters Or $): 525
Forehead Units: 3

## 2022-10-03 ENCOUNTER — APPOINTMENT (RX ONLY)
Dept: URBAN - METROPOLITAN AREA CLINIC 173 | Facility: CLINIC | Age: 52
Setting detail: DERMATOLOGY
End: 2022-10-03

## 2022-10-03 ENCOUNTER — RX ONLY (OUTPATIENT)
Age: 52
Setting detail: RX ONLY
End: 2022-10-03

## 2022-10-03 DIAGNOSIS — Z41.9 ENCOUNTER FOR PROCEDURE FOR PURPOSES OTHER THAN REMEDYING HEALTH STATE, UNSPECIFIED: ICD-10-CM

## 2022-10-03 PROCEDURE — ? COSMETIC CONSULTATION: LASER RESURFACING

## 2022-10-03 PROCEDURE — ? BOTOX

## 2022-10-03 PROCEDURE — ? PATIENT SPECIFIC COUNSELING

## 2022-10-03 RX ORDER — PHARMACY COMPOUNDING ACCESSORY
EACH MISCELLANEOUS
Qty: 30 | Refills: 1 | Status: ERX | COMMUNITY
Start: 2022-10-03

## 2022-10-03 NOTE — PROCEDURE: BOTOX
Periorbital Skin Units: 29
Show Orbicularis Oculi Units: Yes
Forehead Units: 3
R Brow Units: 0
Show Right And Left Pupillary Line Units: No
Detail Level: Detailed
Expiration Date (Month Year): 06/2024
Additional Area 1 Location: [facility]
Price (Use Numbers Only, No Special Characters Or $): 249
Consent: Written consent obtained. Risks include but not limited to lid/brow ptosis, bruising, swelling, diplopia, temporary effect, incomplete chemical denervation.
Glabellar Complex Units: 18
Additional Area 1 Units: 5
Post-Care Instructions: Patient instructed to not lie down for 4 hours and limit physical activity for 24 hours. Patient instructed not to travel by airplane for 48 hours.
Additional Area 2 Location: lips
Additional Area 2 Units: 8
Lot #: [id number]

## 2022-11-14 ENCOUNTER — APPOINTMENT (RX ONLY)
Dept: URBAN - METROPOLITAN AREA CLINIC 173 | Facility: CLINIC | Age: 52
Setting detail: DERMATOLOGY
End: 2022-11-14

## 2022-11-14 DIAGNOSIS — Z41.9 ENCOUNTER FOR PROCEDURE FOR PURPOSES OTHER THAN REMEDYING HEALTH STATE, UNSPECIFIED: ICD-10-CM

## 2022-11-14 PROCEDURE — ? BOTOX

## 2022-11-14 NOTE — PROCEDURE: BOTOX
Mentalis Units: 0
Show Anterior Platysmal Band Units: Yes
Show Right And Left Pupillary Line Units: No
Expiration Date (Month Year): 11/2024
Additional Area 1 Location: lips
Forehead Units: 2
Price (Use Numbers Only, No Special Characters Or $): 180
Consent: Written consent obtained. Risks include but not limited to lid/brow ptosis, bruising, swelling, diplopia, temporary effect, incomplete chemical denervation.
Additional Area 1 Units: 8
Post-Care Instructions: Patient instructed to not lie down for 4 hours and limit physical activity for 24 hours. Patient instructed not to travel by airplane for 48 hours.
Detail Level: Detailed
Lot #: [id number]
Dilution (U/0.1 Cc): 5

## 2023-01-10 ENCOUNTER — APPOINTMENT (RX ONLY)
Dept: URBAN - METROPOLITAN AREA CLINIC 173 | Facility: CLINIC | Age: 53
Setting detail: DERMATOLOGY
End: 2023-01-10

## 2023-01-10 DIAGNOSIS — Z41.9 ENCOUNTER FOR PROCEDURE FOR PURPOSES OTHER THAN REMEDYING HEALTH STATE, UNSPECIFIED: ICD-10-CM

## 2023-01-10 PROCEDURE — ? PATIENT SPECIFIC COUNSELING

## 2023-01-10 PROCEDURE — ? COSMETIC CONSULTATION: LASER RESURFACING

## 2023-01-10 PROCEDURE — ? DAXXIFY

## 2023-01-10 NOTE — PROCEDURE: DAXXIFY
Show Levator Superior Units: Yes
Forehead Units: 3
Additional Area 5 Units: 0
Show Ucl Units: No
Consent: Written consent obtained. Risks include but not limited to lid/brow ptosis, bruising, swelling, diplopia, temporary effect, incomplete chemical denervation.
Additional Area 1 Units: 8
Additional Area 1 Location: lips
Price (Use Numbers Only, No Special Characters Or $): 7690
Glabellar Complex Units: 36
Additional Area 2 Location: [facility]
Post-Care Instructions: Patient instructed to not lie down for 4 hours and limit physical activity for 24 hours.
Additional Area 2 Units: 6
Detail Level: Detailed
Dilution (U/0.1 Cc): 4
Periorbital Skin Units: 56

## 2023-02-07 ENCOUNTER — APPOINTMENT (RX ONLY)
Dept: URBAN - METROPOLITAN AREA CLINIC 173 | Facility: CLINIC | Age: 53
Setting detail: DERMATOLOGY
End: 2023-02-07

## 2023-02-07 DIAGNOSIS — Z41.9 ENCOUNTER FOR PROCEDURE FOR PURPOSES OTHER THAN REMEDYING HEALTH STATE, UNSPECIFIED: ICD-10-CM

## 2023-02-07 PROCEDURE — ? DAXXIFY

## 2023-02-07 NOTE — PROCEDURE: DAXXIFY
Additional Area 6 Units: 0
Show Right And Left Periorbital Units: No
Show Depressor Anguli Units: Yes
Post-Care Instructions: Patient instructed to not lie down for 4 hours and limit physical activity for 24 hours.
Additional Area 1 Units: 8
Detail Level: Detailed
Lot #: [id number]
Dilution (U/0.1 Cc): 4
Periorbital Skin Units: 32
Expiration Date (Month Year): 212024
Consent: Written consent obtained. Risks include but not limited to lid/brow ptosis, bruising, swelling, diplopia, temporary effect, incomplete chemical denervation.
Additional Area 1 Location: lips

## 2023-03-23 ENCOUNTER — APPOINTMENT (RX ONLY)
Dept: URBAN - METROPOLITAN AREA CLINIC 167 | Facility: CLINIC | Age: 53
Setting detail: DERMATOLOGY
End: 2023-03-23

## 2023-03-23 DIAGNOSIS — Z71.89 OTHER SPECIFIED COUNSELING: ICD-10-CM

## 2023-03-23 DIAGNOSIS — L81.4 OTHER MELANIN HYPERPIGMENTATION: ICD-10-CM

## 2023-03-23 DIAGNOSIS — D22 MELANOCYTIC NEVI: ICD-10-CM

## 2023-03-23 DIAGNOSIS — L82.1 OTHER SEBORRHEIC KERATOSIS: ICD-10-CM

## 2023-03-23 PROBLEM — D22.5 MELANOCYTIC NEVI OF TRUNK: Status: ACTIVE | Noted: 2023-03-23

## 2023-03-23 PROCEDURE — 99213 OFFICE O/P EST LOW 20 MIN: CPT

## 2023-03-23 PROCEDURE — ? COUNSELING

## 2023-03-23 ASSESSMENT — LOCATION ZONE DERM
LOCATION ZONE: TRUNK
LOCATION ZONE: ARM

## 2023-03-23 ASSESSMENT — LOCATION DETAILED DESCRIPTION DERM
LOCATION DETAILED: LEFT PROXIMAL DORSAL FOREARM
LOCATION DETAILED: RIGHT ELBOW
LOCATION DETAILED: LEFT SUPERIOR MEDIAL UPPER BACK
LOCATION DETAILED: RIGHT INFERIOR MEDIAL UPPER BACK

## 2023-03-23 ASSESSMENT — LOCATION SIMPLE DESCRIPTION DERM
LOCATION SIMPLE: RIGHT UPPER BACK
LOCATION SIMPLE: LEFT UPPER BACK
LOCATION SIMPLE: RIGHT ELBOW
LOCATION SIMPLE: LEFT FOREARM

## 2023-05-15 ENCOUNTER — APPOINTMENT (RX ONLY)
Dept: URBAN - METROPOLITAN AREA CLINIC 173 | Facility: CLINIC | Age: 53
Setting detail: DERMATOLOGY
End: 2023-05-15

## 2023-05-15 DIAGNOSIS — Z41.9 ENCOUNTER FOR PROCEDURE FOR PURPOSES OTHER THAN REMEDYING HEALTH STATE, UNSPECIFIED: ICD-10-CM

## 2023-05-15 PROCEDURE — ? BOTOX

## 2023-05-15 NOTE — PROCEDURE: BOTOX
Additional Area 4 Units: 0
Additional Area 1 Units: 8
Show Forehead Units: Yes
Show Ucl Units: No
Additional Area 1 Location: Lip
Detail Level: Detailed
Expiration Date (Month Year): 10/25
Price (Use Numbers Only, No Special Characters Or $): 160
Consent: Written consent obtained. Risks include but not limited to lid/brow ptosis, bruising, swelling, diplopia, temporary effect, incomplete chemical denervation.
Post-Care Instructions: Patient instructed to not lie down for 4 hours and limit physical activity for 24 hours. Patient instructed not to travel by airplane for 48 hours.
Dilution (U/0.1 Cc): 5
Incrementing Botox Units: By 0.5 Units
Lot #: [id number]

## 2023-06-19 ENCOUNTER — APPOINTMENT (RX ONLY)
Dept: URBAN - METROPOLITAN AREA CLINIC 173 | Facility: CLINIC | Age: 53
Setting detail: DERMATOLOGY
End: 2023-06-19

## 2023-06-19 DIAGNOSIS — Z41.9 ENCOUNTER FOR PROCEDURE FOR PURPOSES OTHER THAN REMEDYING HEALTH STATE, UNSPECIFIED: ICD-10-CM

## 2023-06-19 PROCEDURE — ? COSMETIC CONSULTATION: LASER RESURFACING

## 2023-06-19 PROCEDURE — ? DAXXIFY

## 2023-06-19 PROCEDURE — ? COSMETIC CONSULTATION - MICRO-NEEDLING

## 2023-06-19 NOTE — PROCEDURE: DAXXIFY
Show Levator Superior Units: Yes
Additional Area 5 Units: 0
Show Ucl Units: No
Consent: Written consent obtained. Risks include but not limited to lid/brow ptosis, bruising, swelling, diplopia, temporary effect, incomplete chemical denervation.
Additional Area 1 Units: 16
Additional Area 1 Location: Lips
Price (Use Numbers Only, No Special Characters Or $): 4544
Glabellar Complex Units: 36
Additional Area 2 Location: [facility]
Post-Care Instructions: Patient instructed to not lie down for 4 hours and limit physical activity for 24 hours.
Depressor Anguli Oris Units: 16
Lot #: [id number]
Additional Area 2 Units: 10
Detail Level: Detailed
Dilution (U/0.1 Cc): 4
Periorbital Skin Units: 64
Expiration Date (Month Year): 02/2024

## 2023-07-10 ENCOUNTER — APPOINTMENT (RX ONLY)
Dept: URBAN - METROPOLITAN AREA CLINIC 173 | Facility: CLINIC | Age: 53
Setting detail: DERMATOLOGY
End: 2023-07-10

## 2023-07-10 DIAGNOSIS — Z41.9 ENCOUNTER FOR PROCEDURE FOR PURPOSES OTHER THAN REMEDYING HEALTH STATE, UNSPECIFIED: ICD-10-CM

## 2023-07-10 DIAGNOSIS — L81.4 OTHER MELANIN HYPERPIGMENTATION: ICD-10-CM

## 2023-07-10 PROCEDURE — ? DAXXIFY

## 2023-07-10 PROCEDURE — ? COSMETIC CONSULTATION: LASER RESURFACING

## 2023-07-10 PROCEDURE — ? PRESCRIPTION

## 2023-07-10 RX ORDER — BIMATOPROST 0.3 MG/ML
SOLUTION/ DROPS OPHTHALMIC
Qty: 1 | Refills: 0 | Status: ERX | COMMUNITY
Start: 2023-07-10

## 2023-07-10 RX ADMIN — BIMATOPROST: 0.3 SOLUTION/ DROPS OPHTHALMIC at 00:00

## 2023-07-10 NOTE — PROCEDURE: DAXXIFY
Show Anterior Platysmal Band Units: Yes
Expiration Date (Month Year): 2/24
Lateral Platysmal Bands Units: 0
Periorbital Skin Units: 4
Post-Care Instructions: Patient instructed to not lie down for 4 hours and limit physical activity for 24 hours.
Consent: Written consent obtained. Risks include but not limited to lid/brow ptosis, bruising, swelling, diplopia, temporary effect, incomplete chemical denervation.
Lot #: [id number] *JESSICA Special*
Show Right And Left Periorbital Units: No
Forehead Units: 3
Detail Level: Detailed

## 2023-09-05 ENCOUNTER — APPOINTMENT (RX ONLY)
Dept: URBAN - METROPOLITAN AREA CLINIC 173 | Facility: CLINIC | Age: 53
Setting detail: DERMATOLOGY
End: 2023-09-05

## 2023-09-05 DIAGNOSIS — Z41.9 ENCOUNTER FOR PROCEDURE FOR PURPOSES OTHER THAN REMEDYING HEALTH STATE, UNSPECIFIED: ICD-10-CM

## 2023-09-05 PROCEDURE — ? DAXXIFY

## 2023-09-05 NOTE — PROCEDURE: DAXXIFY
Show Additional Area 3: Yes
Expiration Date (Month Year): 02/2024
L Brow Units: 0
Lot #: [id number]
Detail Level: Detailed
Price (Use Numbers Only, No Special Characters Or $): 240
Consent: Written consent obtained. Risks include but not limited to lid/brow ptosis, bruising, swelling, diplopia, temporary effect, incomplete chemical denervation.
Show Ucl Units: No
Post-Care Instructions: Patient instructed to not lie down for 4 hours and limit physical activity for 24 hours.
Dilution (U/0.1 Cc): 4
Additional Area 1 Location: Lips
Additional Area 1 Units: 16

## 2023-11-16 ENCOUNTER — APPOINTMENT (RX ONLY)
Dept: URBAN - METROPOLITAN AREA CLINIC 173 | Facility: CLINIC | Age: 53
Setting detail: DERMATOLOGY
End: 2023-11-16

## 2023-11-16 DIAGNOSIS — Z41.9 ENCOUNTER FOR PROCEDURE FOR PURPOSES OTHER THAN REMEDYING HEALTH STATE, UNSPECIFIED: ICD-10-CM

## 2023-11-16 PROCEDURE — ? DAXXIFY

## 2023-11-16 NOTE — PROCEDURE: DAXXIFY
Show Levator Superior Units: Yes
Additional Area 5 Units: 0
Show Ucl Units: No
Consent: Written consent obtained. Risks include but not limited to lid/brow ptosis, bruising, swelling, diplopia, temporary effect, incomplete chemical denervation.
Additional Area 1 Units: 16
Additional Area 1 Location: Lips
Price (Use Numbers Only, No Special Characters Or $): 0325
Glabellar Complex Units: 36
Additional Area 2 Location: [facility]
Post-Care Instructions: Patient instructed to not lie down for 4 hours and limit physical activity for 24 hours.
Depressor Anguli Oris Units: 16
Lot #: [id number]
Additional Area 2 Units: 10
Detail Level: Detailed
Dilution (U/0.1 Cc): 4
Periorbital Skin Units: 64
Expiration Date (Month Year): 02/2024

## 2024-01-29 ENCOUNTER — APPOINTMENT (RX ONLY)
Dept: URBAN - METROPOLITAN AREA CLINIC 173 | Facility: CLINIC | Age: 54
Setting detail: DERMATOLOGY
End: 2024-01-29

## 2024-01-29 DIAGNOSIS — Z41.9 ENCOUNTER FOR PROCEDURE FOR PURPOSES OTHER THAN REMEDYING HEALTH STATE, UNSPECIFIED: ICD-10-CM

## 2024-01-29 PROCEDURE — ? DAXXIFY

## 2024-01-29 PROCEDURE — ? COSMETIC CONSULTATION - MICRO-NEEDLING

## 2024-01-29 PROCEDURE — ? PRESCRIPTION

## 2024-01-29 RX ADMIN — Medication: at 00:00

## 2024-01-29 NOTE — PROCEDURE: DAXXIFY
Right Periorbital Skin Units: 0
Post-Care Instructions: Patient instructed to not lie down for 4 hours and limit physical activity for 24 hours.
Show Additional Area 5: Yes
Show Right And Left Periorbital Units: No
Expiration Date (Month Year): 04/24
Additional Area 1 Location: lip
Bill Summary Price Listed Below, Or Bill Total Of Units X Price Per Unit?: Bill Summary Price Below
Glabellar Complex Units: 16
Price (Use Numbers Only, No Special Characters Or $): 240
Detail Level: Detailed
Lot #: [id number]
Consent: Written consent obtained. Risks include but not limited to lid/brow ptosis, bruising, swelling, diplopia, temporary effect, incomplete chemical denervation.
Dilution (U/0.1 Cc): 4

## 2024-01-30 RX ORDER — PHARMACY COMPOUNDING ACCESSORY
EACH MISCELLANEOUS
Qty: 30 | Refills: 3 | Status: ERX | COMMUNITY
Start: 2024-01-29

## 2024-04-15 ENCOUNTER — APPOINTMENT (RX ONLY)
Dept: URBAN - METROPOLITAN AREA CLINIC 173 | Facility: CLINIC | Age: 54
Setting detail: DERMATOLOGY
End: 2024-04-15

## 2024-04-15 DIAGNOSIS — Z41.9 ENCOUNTER FOR PROCEDURE FOR PURPOSES OTHER THAN REMEDYING HEALTH STATE, UNSPECIFIED: ICD-10-CM

## 2024-04-15 PROCEDURE — ? DAXXIFY

## 2024-04-15 NOTE — PROCEDURE: DAXXIFY
Show Levator Superior Units: Yes
Additional Area 5 Units: 0
Show Ucl Units: No
Consent: Written consent obtained. Risks include but not limited to lid/brow ptosis, bruising, swelling, diplopia, temporary effect, incomplete chemical denervation.
Additional Area 1 Units: 16
Additional Area 1 Location: Lips
Price (Use Numbers Only, No Special Characters Or $): 5829
Glabellar Complex Units: 40
Additional Area 2 Location: [facility]
Post-Care Instructions: Patient instructed to not lie down for 4 hours and limit physical activity for 24 hours.
Lot #: [id number]
Additional Area 2 Units: 10
Detail Level: Detailed
Dilution (U/0.1 Cc): 4
Topical Anesthesia?: 23% lidocaine, 7% tetracaine
Periorbital Skin Units: 64
Expiration Date (Month Year): 09/2024
Length Of Topical Anesthesia Application (Optional): 20 minutes
Bill Summary Price Listed Below, Or Bill Total Of Units X Price Per Unit?: Bill Summary Price Below

## 2024-06-14 ENCOUNTER — APPOINTMENT (RX ONLY)
Dept: URBAN - METROPOLITAN AREA CLINIC 173 | Facility: CLINIC | Age: 54
Setting detail: DERMATOLOGY
End: 2024-06-14

## 2024-06-14 ENCOUNTER — RX ONLY (OUTPATIENT)
Age: 54
Setting detail: RX ONLY
End: 2024-06-14

## 2024-06-14 DIAGNOSIS — Z41.9 ENCOUNTER FOR PROCEDURE FOR PURPOSES OTHER THAN REMEDYING HEALTH STATE, UNSPECIFIED: ICD-10-CM

## 2024-06-14 PROCEDURE — ? DAXXIFY

## 2024-06-14 PROCEDURE — ? MEDICAL CONSULTATION: SKIN TIGHTENING

## 2024-06-14 RX ORDER — PHARMACY COMPOUNDING ACCESSORY
EACH MISCELLANEOUS
Qty: 30 | Refills: 1 | Status: CANCELLED
Stop reason: CLARIF

## 2024-06-14 RX ORDER — PHARMACY COMPOUNDING ACCESSORY
EACH MISCELLANEOUS
Qty: 30 | Refills: 3 | Status: ERX | COMMUNITY
Start: 2024-06-14

## 2024-06-14 RX ADMIN — Medication: at 00:00

## 2024-06-14 NOTE — PROCEDURE: DAXXIFY
Price (Use Numbers Only, No Special Characters Or $): 240
L Brow Units: 0
Show Additional Area 3: Yes
Show Ucl Units: No
Consent: Written consent obtained. Risks include but not limited to lid/brow ptosis, bruising, swelling, diplopia, temporary effect, incomplete chemical denervation.
Detail Level: Detailed
Post-Care Instructions: Patient instructed to not lie down for 4 hours and limit physical activity for 24 hours.
Expiration Date (Month Year): 01/2026
Bill Summary Price Listed Below, Or Bill Total Of Units X Price Per Unit?: Bill Summary Price Below
Additional Area 1 Location: Lips
Additional Area 1 Units: 16
Lot #: [id number]
Dilution (U/0.1 Cc): 4

## 2024-09-09 ENCOUNTER — APPOINTMENT (RX ONLY)
Dept: URBAN - METROPOLITAN AREA CLINIC 173 | Facility: CLINIC | Age: 54
Setting detail: DERMATOLOGY
End: 2024-09-09

## 2024-09-09 DIAGNOSIS — Z41.9 ENCOUNTER FOR PROCEDURE FOR PURPOSES OTHER THAN REMEDYING HEALTH STATE, UNSPECIFIED: ICD-10-CM

## 2024-09-09 PROCEDURE — ? OTHER

## 2024-09-09 PROCEDURE — ? DAXXIFY

## 2024-09-09 NOTE — PROCEDURE: OTHER
Other (Free Text): Discussed Botox versus Daxxify. Patient said she would watch how long it lasts for the next 4 months. Patient and Dr. Shirley came to conclusion to stick with Daxxify.
Detail Level: Zone
Note Text (......Xxx Chief Complaint.): This diagnosis correlates with the
Render Risk Assessment In Note?: no

## 2024-11-07 ENCOUNTER — APPOINTMENT (RX ONLY)
Dept: URBAN - METROPOLITAN AREA CLINIC 173 | Facility: CLINIC | Age: 54
Setting detail: DERMATOLOGY
End: 2024-11-07

## 2024-11-07 DIAGNOSIS — Z41.9 ENCOUNTER FOR PROCEDURE FOR PURPOSES OTHER THAN REMEDYING HEALTH STATE, UNSPECIFIED: ICD-10-CM

## 2024-11-07 PROCEDURE — ? BOTOX

## 2024-11-07 NOTE — PROCEDURE: BOTOX
Show Levator Superior Units: Yes
R Brow Units: 0
Price (Use Numbers Only, No Special Characters Or $): 651
Expiration Date (Month Year): 03/2027
Show Right And Left Brow Units: No
Depressor Anguli Oris Units: 8
Incrementing Botox Units: By 0.5 Units
Additional Area 2 Location: [place]
Dilution (U/0.1 Cc): 5
Additional Area 2 Units: 6
Lot #: [id number]
Post-Care Instructions: Patient instructed to not lie down for 4 hours and limit physical activity for 24 hours. Patient instructed not to travel by airplane for 48 hours.
Additional Area 1 Location: Lips
Consent: Written consent obtained. Risks include but not limited to lid/brow ptosis, bruising, swelling, diplopia, temporary effect, incomplete chemical denervation.
Detail Level: Detailed

## 2025-01-20 ENCOUNTER — APPOINTMENT (OUTPATIENT)
Dept: URBAN - METROPOLITAN AREA CLINIC 173 | Facility: CLINIC | Age: 55
Setting detail: DERMATOLOGY
End: 2025-01-20

## 2025-01-20 DIAGNOSIS — Z41.9 ENCOUNTER FOR PROCEDURE FOR PURPOSES OTHER THAN REMEDYING HEALTH STATE, UNSPECIFIED: ICD-10-CM

## 2025-01-20 PROCEDURE — ? BOTOX

## 2025-01-20 NOTE — PROCEDURE: BOTOX
Right Periorbital Skin Units: 0
Post-Care Instructions: Patient instructed to not lie down for 4 hours and limit physical activity for 24 hours. Patient instructed not to travel by airplane for 48 hours.
Additional Area 2 Units: 8
Periorbital Skin Units: 30
Show Additional Area 1: Yes
Lot #: [id number]
Show Right And Left Periorbital Units: No
Incrementing Botox Units: By 0.5 Units
Dilution (U/0.1 Cc): 5
Additional Area 1 Location: [facility]
Additional Area 1 Units: 6
Glabellar Complex Units: 22
Detail Level: Detailed
Expiration Date (Month Year): 3/2027
Additional Area 2 Location: lips
Consent: Written consent obtained. Risks include but not limited to lid/brow ptosis, bruising, swelling, diplopia, temporary effect, incomplete chemical denervation.
Price (Use Numbers Only, No Special Characters Or $): 833

## 2025-03-06 ENCOUNTER — APPOINTMENT (OUTPATIENT)
Dept: URBAN - METROPOLITAN AREA CLINIC 173 | Facility: CLINIC | Age: 55
Setting detail: DERMATOLOGY
End: 2025-03-06

## 2025-03-06 DIAGNOSIS — Z41.9 ENCOUNTER FOR PROCEDURE FOR PURPOSES OTHER THAN REMEDYING HEALTH STATE, UNSPECIFIED: ICD-10-CM

## 2025-03-06 PROCEDURE — ? BOTOX

## 2025-05-08 ENCOUNTER — APPOINTMENT (OUTPATIENT)
Dept: URBAN - METROPOLITAN AREA CLINIC 173 | Facility: CLINIC | Age: 55
Setting detail: DERMATOLOGY
End: 2025-05-08

## 2025-05-08 DIAGNOSIS — Z41.9 ENCOUNTER FOR PROCEDURE FOR PURPOSES OTHER THAN REMEDYING HEALTH STATE, UNSPECIFIED: ICD-10-CM

## 2025-05-08 PROCEDURE — ? DAXXIFY

## 2025-05-08 PROCEDURE — ? PATIENT SPECIFIC COUNSELING

## 2025-05-08 PROCEDURE — ? BOTOX

## 2025-05-08 NOTE — PROCEDURE: BOTOX
Depressor Anguli Oris Units: 0
Show Forehead Units: Yes
Periorbital Skin Units: 32
Show Right And Left Pupillary Line Units: No
Expiration Date (Month Year): 5/27
Depressor Anguli Oris Units: 6
Additional Area 1 Location: [facility]
Incrementing Botox Units: By 0.5 Units
Price (Use Numbers Only, No Special Characters Or $): 054
Consent: Written consent obtained. Risks include but not limited to lid/brow ptosis, bruising, swelling, diplopia, temporary effect, incomplete chemical denervation.
Post-Care Instructions: Patient instructed to not lie down for 4 hours and limit physical activity for 24 hours. Patient instructed not to travel by airplane for 48 hours.
Glabellar Complex Units: 22
Detail Level: Detailed
Lot #: [id number]
Dilution (U/0.1 Cc): 5

## 2025-05-08 NOTE — PROCEDURE: DAXXIFY
Detail Level: Detailed
Show Right And Left Periorbital Units: No
Right Pupillary Line Units: 0
Additional Area 1 Location: Lips
Show Additional Area 2: Yes
Expiration Date (Month Year): 3/26
Additional Area 1 Units: 16
Bill Summary Price Listed Below, Or Bill Total Of Units X Price Per Unit?: Bill Summary Price Below
Lot #: [id number] *JESSICA Special*
Dilution (U/0.1 Cc): 4
Price (Use Numbers Only, No Special Characters Or $): 150
Consent: Written consent obtained. Risks include but not limited to lid/brow ptosis, bruising, swelling, diplopia, temporary effect, incomplete chemical denervation.
Post-Care Instructions: Patient instructed to not lie down for 4 hours and limit physical activity for 24 hours.

## 2025-05-08 NOTE — PROCEDURE: PATIENT SPECIFIC COUNSELING
Detail Level: Zone
Discussed Sculptra for skin laxity on the face. Quoted $1200 for 1 vial and $2000 for 2 vials. Stated that pt would need 1-2 treatments. Pt given handout. \\n.\\nDiscussed Ellacor treatment for skin laxity. Quoted $4000 for face and $4750 for F/N. Pt given brochure and handout.

## 2025-07-22 ENCOUNTER — APPOINTMENT (OUTPATIENT)
Dept: URBAN - METROPOLITAN AREA CLINIC 173 | Facility: CLINIC | Age: 55
Setting detail: DERMATOLOGY
End: 2025-07-22

## 2025-07-22 DIAGNOSIS — Z41.9 ENCOUNTER FOR PROCEDURE FOR PURPOSES OTHER THAN REMEDYING HEALTH STATE, UNSPECIFIED: ICD-10-CM

## 2025-07-22 PROCEDURE — ? BOTOX

## 2025-07-22 NOTE — PROCEDURE: BOTOX
Additional Area 5 Units: 0
Depressor Anguli Oris Units: 6
Show Forehead Units: Yes
Show Right And Left Pupillary Line Units: No
Additional Area 1 Location: [facility]
Detail Level: Detailed
Lot #: [id number] **JESSICA special**
Expiration Date (Month Year): 10/2027
Dilution (U/0.1 Cc): 5
Additional Area 1 Units: 3
Consent: Written consent obtained. Risks include but not limited to lid/brow ptosis, bruising, swelling, diplopia, temporary effect, incomplete chemical denervation.
Post-Care Instructions: Patient instructed to not lie down for 4 hours and limit physical activity for 24 hours. Patient instructed not to travel by airplane for 48 hours.
Incrementing Botox Units: By 0.5 Units

## 2025-08-18 ENCOUNTER — APPOINTMENT (OUTPATIENT)
Dept: URBAN - METROPOLITAN AREA CLINIC 173 | Facility: CLINIC | Age: 55
Setting detail: DERMATOLOGY
End: 2025-08-18

## 2025-08-18 DIAGNOSIS — Z41.9 ENCOUNTER FOR PROCEDURE FOR PURPOSES OTHER THAN REMEDYING HEALTH STATE, UNSPECIFIED: ICD-10-CM

## 2025-08-18 PROCEDURE — ? DAXXIFY

## 2025-08-18 PROCEDURE — ? BOTOX

## 2025-09-04 ENCOUNTER — APPOINTMENT (OUTPATIENT)
Dept: URBAN - METROPOLITAN AREA CLINIC 173 | Facility: CLINIC | Age: 55
Setting detail: DERMATOLOGY
End: 2025-09-04

## 2025-09-04 DIAGNOSIS — Z41.9 ENCOUNTER FOR PROCEDURE FOR PURPOSES OTHER THAN REMEDYING HEALTH STATE, UNSPECIFIED: ICD-10-CM

## 2025-09-04 PROCEDURE — ? COSMETIC CONSULTATION: FILLERS

## 2025-09-04 PROCEDURE — ? COSMETIC CONSULTATION: SOFWAVE

## 2025-09-04 PROCEDURE — ? COSMETIC CONSULTATION: ELLACOR MICRO-CORING
